# Patient Record
Sex: MALE | Race: BLACK OR AFRICAN AMERICAN | NOT HISPANIC OR LATINO | Employment: FULL TIME | URBAN - METROPOLITAN AREA
[De-identification: names, ages, dates, MRNs, and addresses within clinical notes are randomized per-mention and may not be internally consistent; named-entity substitution may affect disease eponyms.]

---

## 2023-09-07 DIAGNOSIS — M54.9 BACK PAIN, UNSPECIFIED BACK LOCATION, UNSPECIFIED BACK PAIN LATERALITY, UNSPECIFIED CHRONICITY: Primary | ICD-10-CM

## 2023-09-07 RX ORDER — MELOXICAM 15 MG/1
TABLET ORAL
COMMUNITY
Start: 2021-03-15 | End: 2023-09-07 | Stop reason: SDUPTHER

## 2023-09-08 RX ORDER — MELOXICAM 15 MG/1
15 TABLET ORAL DAILY
Qty: 30 TABLET | Refills: 0 | Status: SHIPPED | OUTPATIENT
Start: 2023-09-08

## 2024-03-17 ENCOUNTER — HOSPITAL ENCOUNTER (EMERGENCY)
Facility: HOSPITAL | Age: 49
Discharge: HOME | End: 2024-03-17
Attending: EMERGENCY MEDICINE

## 2024-03-17 ENCOUNTER — APPOINTMENT (OUTPATIENT)
Dept: RADIOLOGY | Facility: HOSPITAL | Age: 49
End: 2024-03-17

## 2024-03-17 VITALS
BODY MASS INDEX: 24.11 KG/M2 | OXYGEN SATURATION: 99 % | HEIGHT: 66 IN | DIASTOLIC BLOOD PRESSURE: 105 MMHG | WEIGHT: 150 LBS | SYSTOLIC BLOOD PRESSURE: 163 MMHG | TEMPERATURE: 97.5 F | RESPIRATION RATE: 18 BRPM | HEART RATE: 81 BPM
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
## 2024-03-17 PROCEDURE — 72125 CT NECK SPINE W/O DYE: CPT | Performed by: RADIOLOGY

## 2024-03-17 PROCEDURE — 99285 EMERGENCY DEPT VISIT HI MDM: CPT | Mod: 25

## 2024-03-17 PROCEDURE — 70450 CT HEAD/BRAIN W/O DYE: CPT | Performed by: RADIOLOGY

## 2024-03-17 PROCEDURE — 72125 CT NECK SPINE W/O DYE: CPT

## 2024-03-17 PROCEDURE — 99284 EMERGENCY DEPT VISIT MOD MDM: CPT | Performed by: EMERGENCY MEDICINE

## 2024-03-17 PROCEDURE — 70450 CT HEAD/BRAIN W/O DYE: CPT

## 2024-03-17 ASSESSMENT — COLUMBIA-SUICIDE SEVERITY RATING SCALE - C-SSRS
1. IN THE PAST MONTH, HAVE YOU WISHED YOU WERE DEAD OR WISHED YOU COULD GO TO SLEEP AND NOT WAKE UP?: NO
6. HAVE YOU EVER DONE ANYTHING, STARTED TO DO ANYTHING, OR PREPARED TO DO ANYTHING TO END YOUR LIFE?: NO
2. HAVE YOU ACTUALLY HAD ANY THOUGHTS OF KILLING YOURSELF?: NO

## 2024-03-17 NOTE — ED PROVIDER NOTES
"HPI   Chief Complaint   Patient presents with    Fall       HPI  Patient is a 48-year-old male presenting to the emergency department after sustaining a fall down 5 stairs and striking his forehead and upper lip.  Patient states he was drinking alcohol today however is unable to tell me exactly how much, states it was \"a lot.\"  Patient is intoxicated and not fully cooperative with questioning.  He states only his head hurts with movement, denies pain anywhere else on his body.  He denies any loss of consciousness.  Patient denies being on any blood thinners.  Denies any past medical history.  Patient denies any chest pain, shortness of breath, abdominal pain.                  [place] Coma Scale Score: 15                     Patient History   Past Medical History:   Diagnosis Date    Contusion of unspecified finger without damage to nail, initial encounter 08/02/2018    Finger contusion    Elevated blood-pressure reading, without diagnosis of hypertension 10/15/2018    Blood pressure elevated without history of HTN    Elevated blood-pressure reading, without diagnosis of hypertension 04/24/2020    Elevated blood pressure reading    Laceration without foreign body of unspecified finger without damage to nail, initial encounter 08/02/2018    Finger laceration    Personal history of other (healed) physical injury and trauma 12/13/2016    History of sprain of knee    Personal history of other diseases of urinary system 02/17/2017    History of urethritis    Personal history of other specified conditions 10/08/2019    History of chest pain    Sprain of unspecified site of unspecified knee, initial encounter 12/13/2016    Sprain of knee, unspecified laterality, unspecified ligament, initial encounter    Strain of muscle, fascia and tendon of lower back, initial encounter 04/24/2020    Strain of lumbar region, initial encounter    Testicular pain, unspecified 02/23/2017    Testicular pain     Past Surgical History: "   Procedure Laterality Date    OTHER SURGICAL HISTORY  03/15/2021    No history of surgery     No family history on file.  Social History     Tobacco Use    Smoking status: Not on file    Smokeless tobacco: Not on file   Substance Use Topics    Alcohol use: Not on file    Drug use: Not on file       Physical Exam   ED Triage Vitals [03/17/24 1947]   Temperature Heart Rate Respirations BP   36.4 °C (97.5 °F) 81 18 (!) 163/105      Pulse Ox Temp Source Heart Rate Source Patient Position   99 % Oral Monitor Sitting      BP Location FiO2 (%)     Left arm 21 %       Physical Exam  General: Well developed patient, alert and oriented, intoxicated  HEENT: Abrasions on the forehead and upper lip with dried blood and soft tissue edema. Pupils equal round and reactive, EOMI  Neuro: A&Ox3, full strength in bilateral upper and lower extremities, normal gait. No focal deficits  CV: RRR, nrl S1, S2, no murmur, rubs, or clicks  Resp: Good bilateral air entry. No crackles,  wheezing, or rhonchi  Abdomen: Non distended, + BS, soft, non-tender, no peritoneal signs  Extremities: No lower extremity edema, + PP  Skin: No jaundice, no lesions   Psych: Appropriate mood and behavior    ED Course & MDM        Medical Decision Making  48-year-old male presenting after a fall down approximately 5 stairs and striking his head and face in setting of alcohol intoxication.  Denies LOC.  On arrival patient is hemodynamically and vitally stable with mildly elevated blood pressure to 163/105.  Abrasions noted on his forehead and upper lip.  Neurologic exam unremarkable, no focal deficits.  No neck pain or step-off deformities noted on the spine.  Patient denies any pain anywhere on his body other than his forehead.  CT head and C-spine ordered and were unremarkable.  Patient was requesting to go home and was discharged in stable condition with instruction to follow-up with his PCP and return to the emergency department if he develops worsening  headache or vision changes.    Procedure  Procedures     Richar Becmkan DO  Resident  03/17/24 2032

## 2024-03-17 NOTE — ED TRIAGE NOTES
PT arrives from home via [facility].EMS with c/o of fall down 5 steps.  PT denies LOC and denies thinners. PT +ETOH. PT has small lac on forehead, bleeding controlled and small abrasion under nose. PT verbalizes no complaints.